# Patient Record
(demographics unavailable — no encounter records)

---

## 2025-01-27 NOTE — PHYSICAL EXAM
[Normocephalic] : normocephalic [Atraumatic] : atraumatic [Sclera nonicteric] : sclera nonicteric [Supple] : supple [No Supraclavicular Adenopathy] : no supraclavicular adenopathy [No Cervical Adenopathy] : no cervical adenopathy [Clear to Auscultation Bilat] : clear to auscultation bilaterally [Normal Sinus Rhythm] : normal sinus rhythm [Examined in the supine and seated position] : examined in the supine and seated position [No dominant masses] : no dominant masses in right breast  [No dominant masses] : no dominant masses left breast [No Nipple Retraction] : no left nipple retraction [No Nipple Discharge] : no left nipple discharge [No Axillary Lymphadenopathy] : no left axillary lymphadenopathy [No Edema] : no edema [No Rashes] : no rashes [No Ulceration] : no ulceration [de-identified] : Trace residual of tiny bleb on nipple

## 2025-01-27 NOTE — PHYSICAL EXAM
[Normocephalic] : normocephalic [Atraumatic] : atraumatic [Sclera nonicteric] : sclera nonicteric [Supple] : supple [No Supraclavicular Adenopathy] : no supraclavicular adenopathy [No Cervical Adenopathy] : no cervical adenopathy [Clear to Auscultation Bilat] : clear to auscultation bilaterally [Normal Sinus Rhythm] : normal sinus rhythm [Examined in the supine and seated position] : examined in the supine and seated position [No dominant masses] : no dominant masses in right breast  [No dominant masses] : no dominant masses left breast [No Nipple Retraction] : no left nipple retraction [No Nipple Discharge] : no left nipple discharge [No Axillary Lymphadenopathy] : no left axillary lymphadenopathy [No Edema] : no edema [No Rashes] : no rashes [No Ulceration] : no ulceration [de-identified] : Trace residual of tiny bleb on nipple

## 2025-01-27 NOTE — CONSULT LETTER
[Dear  ___] : Dear  [unfilled], [Consult Letter:] : I had the pleasure of evaluating your patient, [unfilled]. [Please see my note below.] : Please see my note below. [Consult Closing:] : Thank you very much for allowing me to participate in the care of this patient.  If you have any questions, please do not hesitate to contact me. [Sincerely,] : Sincerely, [FreeTextEntry3] : Berta Beckman MD FACS

## 2025-01-27 NOTE — HISTORY OF PRESENT ILLNESS
[FreeTextEntry1] : Ms. Cota is a 40 year old woman who presents for a consultation for a history of left mastitis and a left nipple bleb. She had a baby in July and since breastfeeding, she has had an episode of left mastitis in November in the medial aspect, in December in the lower outer aspect, and again at the beginning of January in the lower outer aspect. She had fevers and was not given antibiotics, and each episode would resolve in about 3 days. For the past month, she has also noticed a white blister-like lesion on the nipple which is painful during and right after breastfeeding, but not in between. It fluctuates. She currently breastfeeds or pumps 6-7 times a day - pumping 3 times at work, and breastfeeding 3-4 times at home.   Her family history is not significant for any breast cancer.

## 2025-01-27 NOTE — PAST MEDICAL HISTORY
[Menarche Age ____] : age at menarche was [unfilled] [Total Preg ___] : G[unfilled] [Live Births ___] : P[unfilled]  [Age At Live Birth ___] : Age at live birth: [unfilled] [FreeTextEntry8] : currently

## 2025-01-27 NOTE — ASSESSMENT
[FreeTextEntry1] : Ms. Cota is a 40 year old woman with a history of left mastitis and bleb on the left nipple. Her breast exam today is without suspicious findings. There are no signs of malignancy, infection or inflammation. We reviewed that a bleb is a benign lesion; no surgical intervention is indicated at this time. We reviewed that should she wish, she can reduce the amount of breastfeeding / pumping that she does. Should mastitis recur, she is to call immediately to be started on antibiotics and be seen in the office. She understands and is comfortable with the plan. She is encouraged to call if any questions or concerns arise.